# Patient Record
Sex: FEMALE | Race: BLACK OR AFRICAN AMERICAN
[De-identification: names, ages, dates, MRNs, and addresses within clinical notes are randomized per-mention and may not be internally consistent; named-entity substitution may affect disease eponyms.]

---

## 2019-09-22 ENCOUNTER — HOSPITAL ENCOUNTER (EMERGENCY)
Dept: HOSPITAL 61 - ER | Age: 39
Discharge: HOME | End: 2019-09-22
Payer: COMMERCIAL

## 2019-09-22 VITALS — SYSTOLIC BLOOD PRESSURE: 156 MMHG | DIASTOLIC BLOOD PRESSURE: 107 MMHG

## 2019-09-22 VITALS — HEIGHT: 64 IN | BODY MASS INDEX: 31.58 KG/M2 | WEIGHT: 185 LBS
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
## 2019-09-22 DIAGNOSIS — S91.311A: Primary | ICD-10-CM
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
## 2019-09-22 PROCEDURE — 99283 EMERGENCY DEPT VISIT LOW MDM: CPT

## 2019-09-22 PROCEDURE — 12001 RPR S/N/AX/GEN/TRNK 2.5CM/<: CPT

## 2019-09-22 NOTE — PHYS DOC
Past Medical History


Past Medical History:  No Pertinent History


Past Surgical History:  No Surgical History


Alcohol Use:  None


Drug Use:  None





Adult General


Chief Complaint


Chief Complaint:  LACERATION/AVULSION





[facility]


HPI





Patient is a 39  year old female who presents with works for old [employer] and was 

pulling a metal cart behind her when it hit the back of her right heel. Patient 

has a 1 cm long laceration to the back of her right heel that is less than 0.5mm

deep and edges are approximated. Laceration comes open only when she flexes at 

the ankle. Bleeding is controlled. Patient states her tetanus is 3 years old.





Review of Systems


Review of Systems





Constitutional: Denies fever or chills []


Eyes: Denies change in visual acuity, redness, or eye pain []


HENT: Denies nasal congestion or sore throat []


Respiratory: Denies cough or shortness of breath []


Cardiovascular: No additional information not addressed in HPI []


GI: Denies abdominal pain, nausea, vomiting, bloody stools or diarrhea []


: Denies dysuria or hematuria []


Musculoskeletal: Denies back pain or joint pain []


Integument: Denies rash or skin lesions []


Neurologic: Denies headache, focal weakness or sensory changes []


Endocrine: Denies polyuria or polydipsia []





All other systems were reviewed and found to be within normal limits, except as 

documented in this note.





Allergies


Allergies





Allergies








Coded Allergies Type Severity Reaction Last Updated Verified


 


  No Known Drug Allergies    9/22/19 No











Physical Exam


Physical Exam





Constitutional: Well developed, well nourished, no acute distress, non-toxic 

appearance. []


HENT: Normocephalic, atraumatic, bilateral external ears normal, oropharynx 

moist, no oral exudates, nose normal. []


Eyes: PERRLA, EOMI, conjunctiva normal, no discharge. [] 


Neck: Normal range of motion, no tenderness, supple, no stridor. [] 


Cardiovascular:Heart rate regular rhythm, no murmur []


Lungs & Thorax:  Bilateral breath sounds clear to auscultation []


Abdomen: Bowel sounds normal, soft, no tenderness, no masses, no pulsatile 

masses. [] 


Skin: Warm, dry, no erythema, no rash. [] 


Back: No tenderness, no CVA tenderness. [] 


Extremities: No tenderness, no cyanosis, no clubbing, ROM intact, no edema. [] 


Neurologic: Alert and oriented X 3, normal motor function, normal sensory 

function, no focal deficits noted. []


Psychologic: Affect normal, judgement normal, mood normal. []





Current Patient Data


Vital Signs





                                   Vital Signs








  Date Time  Temp Pulse Resp B/P (MAP) Pulse Ox O2 Delivery O2 Flow Rate FiO2


 


9/22/19 17:48 97.0 65 14 156/107 (123) 99 Room Air  





 97.0       











EKG


EKG


[]





Radiology/Procedures


Radiology/Procedures


[]





Course & Med Decision Making


Course & Med Decision Making


Patient is a 39  year old female who presents with works for old [employer] and was 

pulling a metal cart behind her when it hit the back of her right heel. Patient 

has a 1 cm long laceration to the back of her right heel that is less than 0.5mm

 deep and edges are approximated. Laceration comes open only when she flexes at 

the ankle. Bleeding is controlled. Patient states her tetanus is 3 years old. 

Patient is a little sore with a steady gait. There is no swelling or signs of 

infection. Pedal pulses are present. Cap refill less than 3 seconds.





Laceration Repair by me:


Anesthesia:         None needed


Location:         Right heel


Tendon/Joint/Nerves:      No injury


Foreign body:      None detected after copious irrigation of saline and 

chlorhexidine and exploration


Technique:         Derma bond and Steri Strips


Complexity:         No subcutaneous sutures/mucosal repair/edge excision


Post Closure Length:      1 cm





Patient's bleeding was easily controlled in the department and there is no angie

cation of anemia.


No evidence of compartment syndrome, neurologic injury, vascular injury, open 

joint, tendon laceration, or foreign body.


Patient is appropriate for outpatient follow up.





48 hour wound check.  Scar minimization instructions given.





Dragon Disclaimer


Dragon Disclaimer


This electronic medical record was generated, in whole or in part, using a voice

 recognition dictation system.





Departure


Departure


Impression:  


   Primary Impression:  


   Laceration


Disposition:  01 HOME, SELF-CARE


Condition:  STABLE


Referrals:  


NO PCP (PCP)


Patient Instructions:  Laceration Care, Adult





Additional Instructions:  


Follow up with primary care if needed.











JACLYN FARIAS APRN            Sep 22, 2019 18:17

## 2019-10-22 ENCOUNTER — HOSPITAL ENCOUNTER (EMERGENCY)
Dept: HOSPITAL 61 - ER | Age: 39
Discharge: HOME | End: 2019-10-22
Payer: SELF-PAY

## 2019-10-22 VITALS — BODY MASS INDEX: 31.07 KG/M2 | HEIGHT: 64 IN | WEIGHT: 182 LBS

## 2019-10-22 VITALS — SYSTOLIC BLOOD PRESSURE: 106 MMHG | DIASTOLIC BLOOD PRESSURE: 71 MMHG

## 2019-10-22 DIAGNOSIS — N39.0: Primary | ICD-10-CM

## 2019-10-22 DIAGNOSIS — G43.909: ICD-10-CM

## 2019-10-22 DIAGNOSIS — Z98.51: ICD-10-CM

## 2019-10-22 LAB
ALBUMIN SERPL-MCNC: 3.8 G/DL (ref 3.4–5)
ALBUMIN/GLOB SERPL: 0.9 {RATIO} (ref 1–1.7)
ALP SERPL-CCNC: 75 U/L (ref 46–116)
ALT SERPL-CCNC: 14 U/L (ref 14–59)
ANION GAP SERPL CALC-SCNC: 12 MMOL/L (ref 6–14)
APTT PPP: YELLOW S
AST SERPL-CCNC: 33 U/L (ref 15–37)
BACTERIA #/AREA URNS HPF: (no result) /HPF
BASOPHILS # BLD AUTO: 0 X10^3/UL (ref 0–0.2)
BASOPHILS NFR BLD: 0 % (ref 0–3)
BILIRUB SERPL-MCNC: 0.7 MG/DL (ref 0.2–1)
BILIRUB UR QL STRIP: NEGATIVE
BUN SERPL-MCNC: 7 MG/DL (ref 7–20)
BUN/CREAT SERPL: 9 (ref 6–20)
CALCIUM SERPL-MCNC: 9 MG/DL (ref 8.5–10.1)
CHLORIDE SERPL-SCNC: 101 MMOL/L (ref 98–107)
CO2 SERPL-SCNC: 27 MMOL/L (ref 21–32)
CREAT SERPL-MCNC: 0.8 MG/DL (ref 0.6–1)
EOSINOPHIL NFR BLD: 0.1 X10^3/UL (ref 0–0.7)
EOSINOPHIL NFR BLD: 1 % (ref 0–3)
ERYTHROCYTE [DISTWIDTH] IN BLOOD BY AUTOMATED COUNT: 16.6 % (ref 11.5–14.5)
FIBRINOGEN PPP-MCNC: (no result) MG/DL
GFR SERPLBLD BASED ON 1.73 SQ M-ARVRAT: 95.6 ML/MIN
GLOBULIN SER-MCNC: 4.2 G/DL (ref 2.2–3.8)
GLUCOSE SERPL-MCNC: 119 MG/DL (ref 70–99)
HCT VFR BLD CALC: 32.2 % (ref 36–47)
HGB BLD-MCNC: 10.1 G/DL (ref 12–15.5)
LYMPHOCYTES # BLD: 1.3 X10^3/UL (ref 1–4.8)
LYMPHOCYTES NFR BLD AUTO: 19 % (ref 24–48)
MCH RBC QN AUTO: 24 PG (ref 25–35)
MCHC RBC AUTO-ENTMCNC: 31 G/DL (ref 31–37)
MCV RBC AUTO: 78 FL (ref 79–100)
MONO #: 0.6 X10^3/UL (ref 0–1.1)
MONOCYTES NFR BLD: 8 % (ref 0–9)
NEUT #: 4.9 X10^3/UL (ref 1.8–7.7)
NEUTROPHILS NFR BLD AUTO: 71 % (ref 31–73)
NITRITE UR QL STRIP: NEGATIVE
PH UR STRIP: 6.5 [PH]
PLATELET # BLD AUTO: 253 X10^3/UL (ref 140–400)
POTASSIUM SERPL-SCNC: 4.1 MMOL/L (ref 3.5–5.1)
PROT SERPL-MCNC: 8 G/DL (ref 6.4–8.2)
PROT UR STRIP-MCNC: NEGATIVE MG/DL
RBC # BLD AUTO: 4.15 X10^6/UL (ref 3.5–5.4)
RBC #/AREA URNS HPF: 0 /HPF (ref 0–2)
SODIUM SERPL-SCNC: 140 MMOL/L (ref 136–145)
SQUAMOUS #/AREA URNS LPF: (no result) /LPF
UROBILINOGEN UR-MCNC: 1 MG/DL
WBC # BLD AUTO: 6.9 X10^3/UL (ref 4–11)
WBC #/AREA URNS HPF: >40 /HPF (ref 0–4)

## 2019-10-22 PROCEDURE — 83690 ASSAY OF LIPASE: CPT

## 2019-10-22 PROCEDURE — 99284 EMERGENCY DEPT VISIT MOD MDM: CPT

## 2019-10-22 PROCEDURE — 96374 THER/PROPH/DIAG INJ IV PUSH: CPT

## 2019-10-22 PROCEDURE — 85025 COMPLETE CBC W/AUTO DIFF WBC: CPT

## 2019-10-22 PROCEDURE — 36415 COLL VENOUS BLD VENIPUNCTURE: CPT

## 2019-10-22 PROCEDURE — 81001 URINALYSIS AUTO W/SCOPE: CPT

## 2019-10-22 PROCEDURE — 87086 URINE CULTURE/COLONY COUNT: CPT

## 2019-10-22 PROCEDURE — 80053 COMPREHEN METABOLIC PANEL: CPT

## 2019-10-22 NOTE — PHYS DOC
Past Medical History


Past Medical History:  Migraines


Past Surgical History:  Tubal ligation


Alcohol Use:  None


Drug Use:  None





Adult General


Chief Complaint


Chief Complaint:  ABDOMINAL PAIN





HPI


HPI





39-year-old female presents to the emergency department with complaints of 

epigastric plan, states this started on Sunday, described as achy sensation. 

Denies any radiation of the pain. Movements make it worse. She denies any nausea

or vomiting or fever. Pain has been progressive of which is why she presented to

the ER today for further evaluation.  No PMH according to the patient





Review of Systems


Review of Systems





Constitutional: Denies fever or chills []


Respiratory: Denies cough or shortness of breath []


Cardiovascular: No additional information not addressed in HPI []


GI: Denies abdominal pain, nausea, vomiting, bloody stools or diarrhea []


: Denies dysuria or hematuria []


Musculoskeletal: bilateral flank pain


Integument: Denies rash or skin lesions []


Neurologic: Denies headache, focal weakness or sensory changes []





All other systems were reviewed and found to be within normal limits, except as 

documented in this note.





Current Medications


Current Medications





Current Medications








 Medications


  (Trade)  Dose


 Ordered  Sig/Amy  Start Time


 Stop Time Status Last Admin


Dose Admin


 


 Ceftriaxone Sodium


  (Rocephin)  1 gm  1X  ONCE  10/22/19 15:30


 10/22/19 15:31 DC  





 


 Multi-Ingredient


 Mouthwash/Gargle


  (Gi Cocktail)  20 ml  1X  ONCE  10/22/19 15:30


 10/22/19 15:31 DC  





 


 Sodium Chloride  1,000 ml @ 


 1,000 mls/hr  1X  ONCE  10/22/19 15:30


 10/22/19 16:29   














Allergies


Allergies





Allergies








Coded Allergies Type Severity Reaction Last Updated Verified


 


  No Known Drug Allergies    9/22/19 No











Physical Exam


Physical Exam





Constitutional: Well developed, well nourished, mild distress 2/2 pain, non-

toxic appearance. []


HENT: Normocephalic, atraumatic, bilateral external ears normal, oropharynx m

oist, no oral exudates, nose normal. []


Eyes: PERRLA, EOMI, conjunctiva normal, no discharge. [] 


Cardiovascular:Heart rate regular rhythm, no murmur []


Lungs & Thorax:  Bilateral breath sounds clear to auscultation []


Abdomen: normal BS, epigastric pain [] 


Skin: Warm, dry, no erythema, no rash. [] 


Back: No tenderness, no CVA tenderness. [] 


Extremities: No tenderness, no edema. [] 


Neurologic: Alert and oriented X 3, no focal deficits noted. []


Psychologic: Affect normal, judgement normal, mood normal. []





Current Patient Data


Vital Signs





                                   Vital Signs








  Date Time  Temp Pulse Resp B/P (MAP) Pulse Ox O2 Delivery O2 Flow Rate FiO2


 


10/22/19 14:08 98.5 84 19 149/74 (99) 98 Room Air  





 98.5       








Lab Values





                                Laboratory Tests








Test


 10/22/19


14:15 10/22/19


14:40


 


White Blood Count


 6.9 x10^3/uL


(4.0-11.0) 





 


Red Blood Count


 4.15 x10^6/uL


(3.50-5.40) 





 


Hemoglobin


 10.1 g/dL


(12.0-15.5)  L 





 


Hematocrit


 32.2 %


(36.0-47.0)  L 





 


Mean Corpuscular Volume


 78 fL ()


L 





 


Mean Corpuscular Hemoglobin


 24 pg (25-35)


L 





 


Mean Corpuscular Hemoglobin


Concent 31 g/dL


(31-37) 





 


Red Cell Distribution Width


 16.6 %


(11.5-14.5)  H 





 


Platelet Count


 253 x10^3/uL


(140-400) 





 


Neutrophils (%) (Auto) 71 % (31-73)   


 


Lymphocytes (%) (Auto) 19 % (24-48)  L 


 


Monocytes (%) (Auto) 8 % (0-9)   


 


Eosinophils (%) (Auto) 1 % (0-3)   


 


Basophils (%) (Auto) 0 % (0-3)   


 


Neutrophils # (Auto)


 4.9 x10^3/uL


(1.8-7.7) 





 


Lymphocytes # (Auto)


 1.3 x10^3/uL


(1.0-4.8) 





 


Monocytes # (Auto)


 0.6 x10^3/uL


(0.0-1.1) 





 


Eosinophils # (Auto)


 0.1 x10^3/uL


(0.0-0.7) 





 


Basophils # (Auto)


 0.0 x10^3/uL


(0.0-0.2) 





 


Sodium Level


 140 mmol/L


(136-145) 





 


Potassium Level


 4.1 mmol/L


(3.5-5.1) 





 


Chloride Level


 101 mmol/L


() 





 


Carbon Dioxide Level


 27 mmol/L


(21-32) 





 


Anion Gap 12 (6-14)   


 


Blood Urea Nitrogen


 7 mg/dL (7-20)


 





 


Creatinine


 0.8 mg/dL


(0.6-1.0) 





 


Estimated GFR


(Cockcroft-Gault) 95.6  


 





 


BUN/Creatinine Ratio 9 (6-20)   


 


Glucose Level


 119 mg/dL


(70-99)  H 





 


Calcium Level


 9.0 mg/dL


(8.5-10.1) 





 


Total Bilirubin


 0.7 mg/dL


(0.2-1.0) 





 


Aspartate Amino Transferase


(AST) 33 U/L (15-37)


 





 


Alanine Aminotransferase (ALT)


 14 U/L (14-59)


 





 


Alkaline Phosphatase


 75 U/L


() 





 


Total Protein


 8.0 g/dL


(6.4-8.2) 





 


Albumin


 3.8 g/dL


(3.4-5.0) 





 


Albumin/Globulin Ratio


 0.9 (1.0-1.7)


L 





 


Lipase


 63 U/L


()  L 





 


Urine Collection Type  Unknown  


 


Urine Color  Yellow  


 


Urine Clarity  Cloudy  


 


Urine pH  6.5  


 


Urine Specific Gravity  1.020  


 


Urine Protein


 


 Negative mg/dL


(NEG-TRACE)


 


Urine Glucose (UA)


 


 Negative mg/dL


(NEG)


 


Urine Ketones (Stick)


 


 Trace mg/dL


(NEG)


 


Urine Blood


 


 Negative (NEG)





 


Urine Nitrite


 


 Negative (NEG)





 


Urine Bilirubin


 


 Negative (NEG)





 


Urine Urobilinogen Dipstick


 


 1.0 mg/dL (0.2


mg/dL)


 


Urine Leukocyte Esterase


 


 Moderate (NEG)





 


Urine RBC  0 /HPF (0-2)  


 


Urine WBC


 


 >40 /HPF (0-4)





 


Urine Squamous Epithelial


Cells 


 Many /LPF  





 


Urine Bacteria


 


 Many /HPF


(0-FEW)


 


Urine Mucus  Marked /LPF  





                                Laboratory Tests


10/22/19 14:15








                                Laboratory Tests


10/22/19 14:15














EKG


EKG


[]





Radiology/Procedures


Radiology/Procedures


[]





Course & Med Decision Making


Course & Med Decision Making


Pertinent Labs and Imaging studies reviewed. (See chart for details)





[]39-year-old female presents to the emergency department with complaints of 

epigastric plan, states this started on Sunday, described as achy sensation. 

Denies any radiation of the pain. Movements make it worse. She denies any nausea

 or vomiting or fever. Pain has been progressive of which is why she presented 

to the ER today for further evaluation.  No PMH according to the patient





Labs reviewed - UTI


Lipase 63, LFTs normal 


Rocephin IV, IVF 1 liter


No acute findings that explain patient's epigastric pain


Recommend protonix daily/bentyl rx upon discharge





Dragon Disclaimer


Dragon Disclaimer


This electronic medical record was generated, in whole or in part, using a voice

 recognition dictation system.





Departure


Departure


Impression:  


   Primary Impression:  


   Epigastric abdominal pain


   Additional Impression:  


   UTI (urinary tract infection)


Disposition:  01 HOME, SELF-CARE


Condition:  STABLE


Referrals:  


NO PCP (PCP)


Patient Instructions:  Abdominal Pain (Nonspecific), Urinary Tract Infection, 

Child





Additional Instructions:  


Recommend follow up with PCP 3 - 5 days


Return to the ER with worsening symptoms, intractable pain, fever, altered 

mental status


Tylenol/Motrin as needed for pain


Take new medications as directed


Scripts


Cephalexin (KEFLEX) 500 Mg Capsule


2 CAP PO Q12HR for 5 Days, #20 CAP


   Prov: ROBERT CARROLL MD         10/22/19 


Dicyclomine Hcl (DICYCLOMINE HCL) 20 Mg Tablet


1 TAB PO TID, #30 TAB 1 Refill


   Prov: ROBERT CARROLL MD         10/22/19 


Pantoprazole Sodium (PROTONIX) 20 Mg Tablet.dr


1 TAB PO DAILY, #30 TAB


   Prov: ROBERT CARROLL MD         10/22/19





Problem Qualifiers








   Additional Impression:  


   UTI (urinary tract infection)


   Urinary tract infection type:  site unspecified  Hematuria presence:  without

    hematuria  Qualified Codes:  N39.0 - Urinary tract infection, site not 

   specified








ROBERT CARROLL MD              Oct 22, 2019 15:26

## 2021-02-09 ENCOUNTER — HOSPITAL ENCOUNTER (EMERGENCY)
Dept: HOSPITAL 61 - ER | Age: 41
LOS: 1 days | Discharge: HOME | End: 2021-02-10
Payer: COMMERCIAL

## 2021-02-09 VITALS — SYSTOLIC BLOOD PRESSURE: 155 MMHG | DIASTOLIC BLOOD PRESSURE: 102 MMHG

## 2021-02-09 VITALS — BODY MASS INDEX: 33.87 KG/M2 | HEIGHT: 64 IN | WEIGHT: 198.42 LBS

## 2021-02-09 DIAGNOSIS — Z98.51: ICD-10-CM

## 2021-02-09 DIAGNOSIS — G43.909: ICD-10-CM

## 2021-02-09 DIAGNOSIS — M54.5: Primary | ICD-10-CM

## 2021-02-09 LAB
AMORPH SED URNS QL MICRO: PRESENT /HPF
APTT PPP: YELLOW S
BACTERIA #/AREA URNS HPF: (no result) /HPF
BILIRUB UR QL STRIP: NEGATIVE
FIBRINOGEN PPP-MCNC: (no result) MG/DL
NITRITE UR QL STRIP: NEGATIVE
PH UR STRIP: 8 [PH]
PROT UR STRIP-MCNC: NEGATIVE MG/DL
RBC #/AREA URNS HPF: 0 /HPF (ref 0–2)
UROBILINOGEN UR-MCNC: 1 MG/DL
WBC #/AREA URNS HPF: (no result) /HPF (ref 0–4)

## 2021-02-09 PROCEDURE — 99284 EMERGENCY DEPT VISIT MOD MDM: CPT

## 2021-02-09 PROCEDURE — 96372 THER/PROPH/DIAG INJ SC/IM: CPT

## 2021-02-09 PROCEDURE — 72131 CT LUMBAR SPINE W/O DYE: CPT

## 2021-02-09 PROCEDURE — 81025 URINE PREGNANCY TEST: CPT

## 2021-02-09 PROCEDURE — 87086 URINE CULTURE/COLONY COUNT: CPT

## 2021-02-09 PROCEDURE — 81001 URINALYSIS AUTO W/SCOPE: CPT

## 2021-02-09 NOTE — RAD
CT lumbar spine without contrast



History: Back pain



Axial helical images of the lumbar spine were obtained without contrast. Axial, coronal and sagittal 
reconstruction was performed.



Findings:



The vertebral bodies are aligned. There is no loss of vertebral body stature. 



Evaluation of the central canal is limited without contrast. There is no significant central or neuro
foraminal stenosis.







Impression:



No acute findings.



PQRS Compliance Statement:



One or more of the following individualized dose reduction techniques were utilized for this examinat
ion:  

1. Automated exposure control  

2. Adjustment of the mA and/or kV according to patient size  

3. Use of iterative reconstruction technique



Electronically signed by: Mik Angel III, MD (2/9/2021 11:35 PM) [facility]

## 2021-02-09 NOTE — PHYS DOC
Past Medical History


Past Medical History:  Migraines


Past Surgical History:  Tubal ligation


Smoking Status:  Never Smoker


Alcohol Use:  None


Drug Use:  None





General Adult


EDM:


Chief Complaint:  BACK PAIN - NO INJURY





HPI:


HPI:





Patient is a 40  year old female who presents with states  she bent over 

to grab a cart from underneath the sink and she had a hard time standing back 

up.  She states she got a spasm-like pain in her low mid back.  She states that 

when she gets up and starts walking or moving during the day the pain is not as 

bad but when she sits down or tries to lay down for the night it gets really bad

and is hard for her to sleep.  She states she has been taking Tylenol or ib

uprofen without much relief.  She states this is happened before in the past and

she was given muscle relaxers of which helped.  She denies any injuries in the 

past or trauma to her back.  Rates her pain a 9 out of 10 and there is no 

radiation.  She denies any numbness or tingling, loss of bowel and bladder or 

focal weakness.





Review of Systems:


Review of Systems:


Constitutional:   Denies fever or chills. []


Eyes:   Denies change in visual acuity. []


HENT:   Denies nasal congestion or sore throat. [] 


Respiratory:   Denies cough or shortness of breath. [] 


Cardiovascular:   Denies chest pain or edema. [] 


GI:   Denies abdominal pain, nausea, vomiting, bloody stools or diarrhea. [] 


:  Denies dysuria. [] 


Musculoskeletal:   Low mid back pain or denies joint pain. [] 


Integument:   Denies rash. [] 


Neurologic:   Denies headache, focal weakness or sensory changes. [] 


Endocrine:   Denies polyuria or polydipsia. [] 


Lymphatic:  Denies swollen glands. [] 


Psychiatric:  Denies depression or anxiety. []





Heart Score:


Risk Factors:


Risk Factors:  DM, Current or recent (<one month) smoker, HTN, HLP, family 

history of CAD, obesity.


Risk Scores:


Score 0 - 3:  2.5% MACE over next 6 weeks - Discharge Home


Score 4 - 6:  20.3% MACE over next 6 weeks - Admit for Clinical Observation


Score 7 - 10:  72.7% MACE over next 6 weeks - Early Invasive Strategies





Allergies:


Allergies:





Allergies








Coded Allergies Type Severity Reaction Last Updated Verified


 


  No Known Drug Allergies    19 No











Physical Exam:


PE:





Constitutional: Well developed, well nourished, no acute distress, non-toxic 

appearance. []


HENT: Normocephalic, atraumatic, bilateral external ears normal, oropharynx cora

st, no oral exudates, nose normal. []


Eyes: PERRLA, EOMI, conjunctiva normal, no discharge. [] 


Neck: Normal range of motion, no tenderness, supple, no stridor. [] 


Cardiovascular:Heart rate regular rhythm, no murmur []


Lungs & Thorax:  Bilateral breath sounds clear to auscultation []


Abdomen: Bowel sounds normal, soft, no tenderness, no masses, no pulsatile 

masses. [] 


Skin: Warm, dry, no erythema, no rash. [] 


Back: No tenderness, no CVA tenderness. [] 


Extremities: No tenderness, no cyanosis, no clubbing, ROM intact, no edema. [] 


Neurologic: Alert and oriented X 3, normal motor function, normal sensory 

function, no focal deficits noted. []


Psychologic: Affect normal, judgement normal, mood normal.





**Normal physical exam []





Current Patient Data:


Labs:





                                Laboratory Tests








Test


 21


22:47


 


POC Urine HCG, Qualitative


 Hcg negative


(Negative)








Vital Signs:





                                   Vital Signs








  Date Time  Temp Pulse Resp B/P (MAP) Pulse Ox O2 Delivery O2 Flow Rate FiO2


 


21 22:48 98.1 89 20 155/102 (119)  Room Air  





 98.1       











EKG:


EKG:


[]





Radiology/Procedures:


Radiology/Procedures:


[]


Impression:


                            [facility]


                    [address] [place] [address] [phone]


                                        


                                 IMAGING REPORT





                                     Signed





PATIENT: HUMERA ESCAMILLA  ACCOUNT: [id number]     MRN#: [id number]


: 1980           LOCATION: ER              AGE: 40


SEX: F                    EXAM DT: 21         ACCESSION#: [id number]


STATUS: REG ER            ORD. PHYSICIAN: JACLYN FARIAS


REASON: PAIN


PROCEDURE: CT LUMBAR SPINE WO CONTRAST








CT lumbar spine without contrast





History: Back pain





Axial helical images of the lumbar spine were obtained without contrast. Axial, 

coronal and sagittal reconstruction was performed.





Findings:





The vertebral bodies are aligned. There is no loss of vertebral body stature. 





Evaluation of the central canal is limited without contrast. There is no 

significant central or neuroforaminal stenosis.











Impression:





No acute findings.





PQRS Compliance Statement:





One or more of the following individualized dose reduction techniques were 

utilized for this examination:  


1. Automated exposure control  


2. Adjustment of the mA and/or kV according to patient size  


3. Use of iterative reconstruction technique





Electronically signed by: Pepe Tamayo III, MD (2021 11:35 PM) [facility]














DICTATED and SIGNED BY:     EPPE TAMAYO III, MD


DATE:     21 [id number]





Course & Med Decision Making:


Course & Med Decision Making


Pertinent Labs and Imaging studies reviewed. (See chart for details)





See HPI.  Alert and oriented x4.  Ambulatory with steady gait.  Speaks in full 

complete sentences.  Skin pink warm and dry.  No focal bony spinal tenderness.  

Patient can move all extremities without any complications.  No saddle 

paresthesias.  Skin pink warm and dry.  Patient denies urinary symptoms, focal 

weakness, fever, numbness or tingling.





[]





Dragon Disclaimer:


Dragon Disclaimer:


This electronic medical record was generated, in whole or in part, using a voice

 recognition dictation system.





Departure


Departure


Impression:  


   Primary Impression:  


   Back pain


   Qualified Codes:  M54.5 - Low back pain


Disposition:  01 DC HOME SELF CARE/HOMELESS


Condition:  STABLE


Referrals:  


NO PCP (PCP)


Patient Instructions:  Back Injury Prevention, Low Back Strain with Rehab-

[facility]





Additional Instructions:  


Follow-up with your primary care provider.  Take medication as prescribed and 

with food.  Number the medication will make you sleepy so do not drive, drink 

alcohol or do other drugs on top of it.  Also try using a heating pad to your 

back.


Scripts


Ibuprofen (IBUPROFEN) 600 Mg Tablet


600 MG PO PRN Q6HRS PRN for INFLAMMATION, #24 TAB


   Prov: JACLYN FARIAS APRN         21 


Cyclobenzaprine Hcl (CYCLOBENZAPRINE HCL) 5 Mg Tablet


1 TAB PO TID, #21 TAB


   Prov: JACLYN FARIAS APRN         21











JACLYN FARIAS APRN             2021 23:12